# Patient Record
Sex: MALE | Race: BLACK OR AFRICAN AMERICAN | URBAN - METROPOLITAN AREA
[De-identification: names, ages, dates, MRNs, and addresses within clinical notes are randomized per-mention and may not be internally consistent; named-entity substitution may affect disease eponyms.]

---

## 2018-08-10 ENCOUNTER — INPATIENT (INPATIENT)
Facility: HOSPITAL | Age: 43
LOS: 1 days | Discharge: HOME | DRG: 603 | End: 2018-08-12
Attending: HOSPITALIST | Admitting: HOSPITALIST
Payer: COMMERCIAL

## 2018-08-10 VITALS
DIASTOLIC BLOOD PRESSURE: 76 MMHG | SYSTOLIC BLOOD PRESSURE: 153 MMHG | WEIGHT: 244.93 LBS | TEMPERATURE: 99 F | HEART RATE: 97 BPM | OXYGEN SATURATION: 93 % | RESPIRATION RATE: 16 BRPM | HEIGHT: 65 IN

## 2018-08-10 DIAGNOSIS — L03.115 CELLULITIS OF RIGHT LOWER LIMB: ICD-10-CM

## 2018-08-10 DIAGNOSIS — R55 SYNCOPE AND COLLAPSE: ICD-10-CM

## 2018-08-10 DIAGNOSIS — Z29.9 ENCOUNTER FOR PROPHYLACTIC MEASURES, UNSPECIFIED: ICD-10-CM

## 2018-08-10 DIAGNOSIS — I10 ESSENTIAL (PRIMARY) HYPERTENSION: ICD-10-CM

## 2018-08-10 LAB
ALBUMIN SERPL ELPH-MCNC: 4.2 G/DL — SIGNIFICANT CHANGE UP (ref 3.3–5)
ALP SERPL-CCNC: 70 U/L — SIGNIFICANT CHANGE UP (ref 40–120)
ALT FLD-CCNC: 73 U/L — SIGNIFICANT CHANGE UP (ref 12–78)
ANION GAP SERPL CALC-SCNC: 9 MMOL/L — SIGNIFICANT CHANGE UP (ref 5–17)
AST SERPL-CCNC: 32 U/L — SIGNIFICANT CHANGE UP (ref 15–37)
BASOPHILS # BLD AUTO: 0.06 K/UL — SIGNIFICANT CHANGE UP (ref 0–0.2)
BASOPHILS NFR BLD AUTO: 0.3 % — SIGNIFICANT CHANGE UP (ref 0–2)
BILIRUB SERPL-MCNC: 1 MG/DL — SIGNIFICANT CHANGE UP (ref 0.2–1.2)
BUN SERPL-MCNC: 22 MG/DL — SIGNIFICANT CHANGE UP (ref 7–23)
CALCIUM SERPL-MCNC: 8.8 MG/DL — SIGNIFICANT CHANGE UP (ref 8.5–10.1)
CHLORIDE SERPL-SCNC: 98 MMOL/L — SIGNIFICANT CHANGE UP (ref 96–108)
CK MB CFR SERPL CALC: 2 NG/ML — SIGNIFICANT CHANGE UP (ref 0–3.6)
CO2 SERPL-SCNC: 29 MMOL/L — SIGNIFICANT CHANGE UP (ref 22–31)
CREAT SERPL-MCNC: 1.2 MG/DL — SIGNIFICANT CHANGE UP (ref 0.5–1.3)
D DIMER BLD IA.RAPID-MCNC: <150 NG/ML DDU — SIGNIFICANT CHANGE UP
EOSINOPHIL # BLD AUTO: 0.07 K/UL — SIGNIFICANT CHANGE UP (ref 0–0.5)
EOSINOPHIL NFR BLD AUTO: 0.3 % — SIGNIFICANT CHANGE UP (ref 0–6)
GLUCOSE SERPL-MCNC: 100 MG/DL — HIGH (ref 70–99)
HCT VFR BLD CALC: 44.9 % — SIGNIFICANT CHANGE UP (ref 39–50)
HGB BLD-MCNC: 15.7 G/DL — SIGNIFICANT CHANGE UP (ref 13–17)
IMM GRANULOCYTES NFR BLD AUTO: 0.5 % — SIGNIFICANT CHANGE UP (ref 0–1.5)
LACTATE SERPL-SCNC: 0.9 MMOL/L — SIGNIFICANT CHANGE UP (ref 0.7–2)
LYMPHOCYTES # BLD AUTO: 0.98 K/UL — LOW (ref 1–3.3)
LYMPHOCYTES # BLD AUTO: 4.7 % — LOW (ref 13–44)
MCHC RBC-ENTMCNC: 29.2 PG — SIGNIFICANT CHANGE UP (ref 27–34)
MCHC RBC-ENTMCNC: 35 GM/DL — SIGNIFICANT CHANGE UP (ref 32–36)
MCV RBC AUTO: 83.6 FL — SIGNIFICANT CHANGE UP (ref 80–100)
MONOCYTES # BLD AUTO: 1.31 K/UL — HIGH (ref 0–0.9)
MONOCYTES NFR BLD AUTO: 6.2 % — SIGNIFICANT CHANGE UP (ref 2–14)
NEUTROPHILS # BLD AUTO: 18.46 K/UL — HIGH (ref 1.8–7.4)
NEUTROPHILS NFR BLD AUTO: 88 % — HIGH (ref 43–77)
PLATELET # BLD AUTO: 246 K/UL — SIGNIFICANT CHANGE UP (ref 150–400)
POTASSIUM SERPL-MCNC: 4 MMOL/L — SIGNIFICANT CHANGE UP (ref 3.5–5.3)
POTASSIUM SERPL-SCNC: 4 MMOL/L — SIGNIFICANT CHANGE UP (ref 3.5–5.3)
PROT SERPL-MCNC: 7.8 G/DL — SIGNIFICANT CHANGE UP (ref 6–8.3)
RBC # BLD: 5.37 M/UL — SIGNIFICANT CHANGE UP (ref 4.2–5.8)
RBC # FLD: 12.6 % — SIGNIFICANT CHANGE UP (ref 10.3–14.5)
SODIUM SERPL-SCNC: 136 MMOL/L — SIGNIFICANT CHANGE UP (ref 135–145)
TROPONIN I SERPL-MCNC: <.015 NG/ML — SIGNIFICANT CHANGE UP (ref 0.01–0.04)
WBC # BLD: 20.99 K/UL — HIGH (ref 3.8–10.5)
WBC # FLD AUTO: 20.99 K/UL — HIGH (ref 3.8–10.5)

## 2018-08-10 PROCEDURE — 70450 CT HEAD/BRAIN W/O DYE: CPT | Mod: 26

## 2018-08-10 PROCEDURE — 93971 EXTREMITY STUDY: CPT | Mod: 26,RT

## 2018-08-10 PROCEDURE — 93010 ELECTROCARDIOGRAM REPORT: CPT

## 2018-08-10 PROCEDURE — 99284 EMERGENCY DEPT VISIT MOD MDM: CPT

## 2018-08-10 PROCEDURE — 99223 1ST HOSP IP/OBS HIGH 75: CPT | Mod: GC,AI

## 2018-08-10 PROCEDURE — 71045 X-RAY EXAM CHEST 1 VIEW: CPT | Mod: 26

## 2018-08-10 RX ORDER — LACTOBACILLUS ACIDOPHILUS 100MM CELL
1 CAPSULE ORAL
Qty: 0 | Refills: 0 | Status: DISCONTINUED | OUTPATIENT
Start: 2018-08-10 | End: 2018-08-12

## 2018-08-10 RX ORDER — SODIUM CHLORIDE 9 MG/ML
1000 INJECTION INTRAMUSCULAR; INTRAVENOUS; SUBCUTANEOUS ONCE
Qty: 0 | Refills: 0 | Status: COMPLETED | OUTPATIENT
Start: 2018-08-10 | End: 2018-08-10

## 2018-08-10 RX ORDER — HYDROCHLOROTHIAZIDE 25 MG
12.5 TABLET ORAL DAILY
Qty: 0 | Refills: 0 | Status: DISCONTINUED | OUTPATIENT
Start: 2018-08-10 | End: 2018-08-10

## 2018-08-10 RX ORDER — TRAMADOL HYDROCHLORIDE 50 MG/1
25 TABLET ORAL EVERY 8 HOURS
Qty: 0 | Refills: 0 | Status: DISCONTINUED | OUTPATIENT
Start: 2018-08-10 | End: 2018-08-12

## 2018-08-10 RX ORDER — IBUPROFEN 200 MG
400 TABLET ORAL EVERY 6 HOURS
Qty: 0 | Refills: 0 | Status: DISCONTINUED | OUTPATIENT
Start: 2018-08-10 | End: 2018-08-12

## 2018-08-10 RX ORDER — ACETAMINOPHEN 500 MG
650 TABLET ORAL EVERY 6 HOURS
Qty: 0 | Refills: 0 | Status: DISCONTINUED | OUTPATIENT
Start: 2018-08-10 | End: 2018-08-12

## 2018-08-10 RX ORDER — LISINOPRIL 2.5 MG/1
20 TABLET ORAL DAILY
Qty: 0 | Refills: 0 | Status: DISCONTINUED | OUTPATIENT
Start: 2018-08-10 | End: 2018-08-12

## 2018-08-10 RX ADMIN — Medication 100 MILLIGRAM(S): at 17:56

## 2018-08-10 RX ADMIN — SODIUM CHLORIDE 1000 MILLILITER(S): 9 INJECTION INTRAMUSCULAR; INTRAVENOUS; SUBCUTANEOUS at 11:08

## 2018-08-10 RX ADMIN — SODIUM CHLORIDE 2000 MILLILITER(S): 9 INJECTION INTRAMUSCULAR; INTRAVENOUS; SUBCUTANEOUS at 11:08

## 2018-08-10 RX ADMIN — Medication 600 MILLIGRAM(S): at 11:09

## 2018-08-10 RX ADMIN — Medication 100 MILLIGRAM(S): at 11:09

## 2018-08-10 RX ADMIN — Medication 1 TABLET(S): at 18:00

## 2018-08-10 NOTE — H&P ADULT - NSHPSOCIALHISTORY_GEN_ALL_CORE
Marital Status: Single  Occupation: Computer/desk job  Lives with: mom    Performs ADLs independently, ambulates without assistance, uses stairs     Substance Use (street drugs): denies  Tobacco Usage: denies  Alcohol Usage: denies

## 2018-08-10 NOTE — ED PROVIDER NOTE - MEDICAL DECISION MAKING DETAILS
pt with dizziness, syncope and right calf pain and swelling.  labs, US, dimer r/o DVT, PE, cellulitis

## 2018-08-10 NOTE — ED ADULT NURSE NOTE - CHPI ED NUR SYMPTOMS NEG
no congestion/no dizziness/no fever/no chills/no diaphoresis/no back pain/no vomiting/no chest pain/no shortness of breath/no nausea

## 2018-08-10 NOTE — ED PROVIDER NOTE - OBJECTIVE STATEMENT
44 y/o M with c/o dizziness and syncope today while at work.  Pt states he was working and felt dizzy and fell to the ground.  Pt does not believe he lost consciousness.  Pt denies chest pain, SOB, fevers.  Pt is also c/o right calf swelling, pain and redness.  Pt states he completed a course of oral antibiotics 1 week ago for right LE cellulitis which resolved.

## 2018-08-10 NOTE — ED ADULT NURSE NOTE - ED STAT RN HANDOFF DETAILS
Spoke to Dr. Reyes patient made remote tell and diet ordered. MD also made aware of low grade temp for tylrnol order. Patient to go to floor.

## 2018-08-10 NOTE — H&P ADULT - ASSESSMENT
43M with PMH HTN presents with syncope at work, admitted for syncope and R lower extremity cellulitis.

## 2018-08-10 NOTE — H&P ADULT - NSHPREVIEWOFSYSTEMS_GEN_ALL_CORE
CONSTITUTIONAL: No fever, No fatigue  EYES: No eye pain, visual disturbances, or discharge  ENMT:  No ear pain; No sinus or throat pain  NECK: No pain, No stiffness  RESPIRATORY: No cough, wheezing, No hemoptysis; No shortness of breath  CARDIOVASCULAR: No chest pain, palpitations, leg swelling  GASTROINTESTINAL: No abdominal or epigastric pain. No nausea, No vomiting; No diarrhea or constipation  GENITOURINARY: No dysuria, No frequency, No urgency, No hematuria, or incontinence  NEUROLOGICAL: alert and oriented x 3,  No headaches, No dizziness, No numbness,  SKIN:   No itching, burning, rashes, or lesions   MUSCULOSKELETAL: No joint pain or swelling; No back pain  PSYCHIATRIC: No depression, anxiety, mood swings, or difficulty sleeping

## 2018-08-10 NOTE — ED PROVIDER NOTE - CARE PLAN
Principal Discharge DX:	Cellulitis of right lower extremity  Secondary Diagnosis:	Syncope, unspecified syncope type

## 2018-08-10 NOTE — ED ADULT NURSE NOTE - ED STAT RN HANDOFF DETAILS 2
Patient admitted to 3 W report given to RN Lani in no acute distress. To be transported with cardiac monitor and RN and transporter stable in no acute distress , safety maintained.

## 2018-08-10 NOTE — H&P ADULT - PROBLEM SELECTOR PLAN 1
Likely vasovagal given prodromal symptoms. CT head negative.   -Admit to medicine  -Remote telemetry monitor ordered to rule out underlying arrythmia. EKG: NSR and first set of cardiac enzymes negative, less likely cardiac related.  -Follow up orthostatics  -Encourage oral hydration, if orthostatics are positive will order fluids. Will hold HCTZ diuretic until orthostatic results. Likely vasovagal given prodromal symptoms. CT head negative. Doppler neg.   -Admit to medicine  -Remote telemetry monitor ordered to rule out underlying arrythmia. EKG: NSR and first set of cardiac enzymes negative, less likely cardiac related.  -Follow up orthostatics  -Encourage oral hydration, if orthostatics are positive will order fluids. Will hold HCTZ diuretic until orthostatic results.

## 2018-08-10 NOTE — H&P ADULT - PROBLEM SELECTOR PLAN 2
Symptoms resolving, mild erythema with no swelling. Will hold off continued medication treatment. Received 1 dose of IV Clindamycin in the ED.  -Trend temperature curve  -Trend WBC, follow up AM labs  -Pain control with Tylenol PRN for mild pain, Motrin PRN for moderate pain, Ultram PRN for severe pain Symptoms resolving, mild erythema with no swelling. Patient doesn't warrant abx but requesting IV abx, discussed potential adverse effects of c. diff diarrhea.   -Received 1 dose of IV Clindamycin in the ED. Continue IV Clindamycin  -Probiotic with meals  -Trend temperature curve  -Trend WBC, follow up AM labs  -Pain control with Tylenol PRN for mild pain, Motrin PRN for moderate pain, Ultram PRN for severe pain Symptoms resolving, mild erythema with no swelling. Patient doesn't warrant abx but requesting IV abx, discussed potential adverse effects including c. diff diarrhea. Still, patient requests antibiotics   -Received 1 dose of IV Clindamycin in the ED. Continue IV Clindamycin with goal of taper and DC home on PO  -Probiotic with meals  -Trend temperature curve  -Trend WBC, follow up AM labs  -Pain control with Tylenol PRN for mild pain, Motrin PRN for moderate pain, Ultram PRN for severe pain

## 2018-08-10 NOTE — ED ADULT NURSE NOTE - OBJECTIVE STATEMENT
Patient presents to ED with syncopal episode. Patient states he was with a client and he passed on and someone woke him up and EMS was called. Patient also noted to have right calf cellulitis with redness and pain scale 4/10.

## 2018-08-10 NOTE — H&P ADULT - PROBLEM SELECTOR PLAN 3
chronic, stable  -Continue Lisinopril (therapeutic interchange of Ramipril)  -Will hold off HCTZ diuretic until results of orthostatics

## 2018-08-10 NOTE — H&P ADULT - HISTORY OF PRESENT ILLNESS
43M with PMH HTN presents with syncope. Patient reports sitting at his desk at work at 8:30am this morning and started feeling nauseous dizzy, diaphoretic and fainted. Admits to LOC, unsure of duration. Denies head trauma. Denies loss of danika/bladder function. Reports several months ago had a syncopal episode after aggressively coughing, was seen at [facility], and discharged from the ED. Denies change in appetite or fluid intake. Reports 1.5 months ago wad diagnosed with cellulitis of his calf, prescribed Clindamycin 300mg by his PCP, symptoms resolved. Reports cellulitis of the same calf re-emerged 2 weeks ago, prescribed Doxycycline for 10 days (last dose Sunday 8/5), reports erythema and swelling has greatly improved. States this morning he woke up with R calf pain, non-radiating, rated 3-4/10. Denies trauma to area. Denies difficulty ambulating.  Also admits to diaphoresis the past two nights, improved with air conditioning.     In the ED: temp 99.2, HR 93, /73, RR 18, SpO2 95% RA. WBC 20.99. CT head: No acute intracranial hemorrhage, mass effect, or shift of   the midline structures.. U/S venous left lower extremity: negative for DVT. Chest Xray: clear lungs. Received IV Clindamycin, 1L NS bolus in the ED.

## 2018-08-10 NOTE — ED ADULT NURSE NOTE - NSIMPLEMENTINTERV_GEN_ALL_ED
Implemented All Universal Safety Interventions:  [place] to call system. Call bell, personal items and telephone within reach. Instruct patient to call for assistance. Room bathroom lighting operational. Non-slip footwear when patient is off stretcher. Physically safe environment: no spills, clutter or unnecessary equipment. Stretcher in lowest position, wheels locked, appropriate side rails in place.

## 2018-08-10 NOTE — H&P ADULT - PROBLEM SELECTOR PLAN 4
IMPROVE VTE Individual Risk Assessment          RISK                                                          Points  [  ] Previous VTE                                                3  [  ] Thrombophilia                                             2  [  ] Lower limb paralysis                                   2        (unable to hold up >15 seconds)    [  ] Current Cancer                                             2         (within 6 months)  [  ] Immobilization > 24 hrs                              1  [  ] ICU/CCU stay > 24 hours                             1  [  ] Age > 60                                                         1    IMPROVE VTE Score: 0  DVT prophylaxis: SCDs

## 2018-08-10 NOTE — H&P ADULT - NSHPPHYSICALEXAM_GEN_ALL_CORE
Height (cm): 165.1 (08-10 @ 08:57)  Weight (kg): 111.1 (08-10 @ 08:57)  BMI (kg/m2): 40.8 (08-10 @ 08:57)  BSA (m2): 2.16 (08-10 @ 08:57)  Vital Signs Last 24 Hrs  T(C): 37.3 (10 Aug 2018 12:59), Max: 37.3 (10 Aug 2018 12:59)  T(F): 99.2 (10 Aug 2018 12:59), Max: 99.2 (10 Aug 2018 12:59)  HR: 93 (10 Aug 2018 12:59) (93 - 97)  BP: 118/75 (10 Aug 2018 12:59) (118/75 - 153/76)  BP(mean): --  RR: 18 (10 Aug 2018 12:59) (16 - 18)  SpO2: 95% (10 Aug 2018 12:59) (93% - 95%)  [ X] room air   [ ] 02    PHYSICAL EXAM:  GENERAL:  No acute distress, well appearing  HEAD:  AT/NC  ENMT: EOMI, PERRL, moist mucus membranes  NECK:  supple  NERVOUS SYSTEM:  Alert & Oriented X3, no focal deficits  CHEST/LUNG: Clear to auscultation bilaterally, no wheezing, rhonchi, crackles   HEART:  Regular rate and rhythm, No murmurs, rubs, or gallops   ABDOMEN:  soft, nontender, nondistended, positive bowel sounds   EXTREMITIES: No clubbing, cyanosis or edema  SKIN: mild/faint erythema around right lower extremity, no calf tenderness, calf muscles bilaterally symmetrical

## 2018-08-11 PROBLEM — Z00.00 ENCOUNTER FOR PREVENTIVE HEALTH EXAMINATION: Status: ACTIVE | Noted: 2018-08-11

## 2018-08-11 LAB
ANION GAP SERPL CALC-SCNC: 9 MMOL/L — SIGNIFICANT CHANGE UP (ref 5–17)
BASOPHILS # BLD AUTO: 0.03 K/UL — SIGNIFICANT CHANGE UP (ref 0–0.2)
BASOPHILS NFR BLD AUTO: 0.3 % — SIGNIFICANT CHANGE UP (ref 0–2)
BUN SERPL-MCNC: 16 MG/DL — SIGNIFICANT CHANGE UP (ref 7–23)
CALCIUM SERPL-MCNC: 8.6 MG/DL — SIGNIFICANT CHANGE UP (ref 8.5–10.1)
CHLORIDE SERPL-SCNC: 102 MMOL/L — SIGNIFICANT CHANGE UP (ref 96–108)
CO2 SERPL-SCNC: 29 MMOL/L — SIGNIFICANT CHANGE UP (ref 22–31)
CREAT SERPL-MCNC: 0.89 MG/DL — SIGNIFICANT CHANGE UP (ref 0.5–1.3)
EOSINOPHIL # BLD AUTO: 0.12 K/UL — SIGNIFICANT CHANGE UP (ref 0–0.5)
EOSINOPHIL NFR BLD AUTO: 1.2 % — SIGNIFICANT CHANGE UP (ref 0–6)
GLUCOSE SERPL-MCNC: 117 MG/DL — HIGH (ref 70–99)
HCT VFR BLD CALC: 40.4 % — SIGNIFICANT CHANGE UP (ref 39–50)
HGB BLD-MCNC: 14.1 G/DL — SIGNIFICANT CHANGE UP (ref 13–17)
IMM GRANULOCYTES NFR BLD AUTO: 0.3 % — SIGNIFICANT CHANGE UP (ref 0–1.5)
LYMPHOCYTES # BLD AUTO: 1.98 K/UL — SIGNIFICANT CHANGE UP (ref 1–3.3)
LYMPHOCYTES # BLD AUTO: 20.3 % — SIGNIFICANT CHANGE UP (ref 13–44)
MCHC RBC-ENTMCNC: 29.1 PG — SIGNIFICANT CHANGE UP (ref 27–34)
MCHC RBC-ENTMCNC: 34.9 GM/DL — SIGNIFICANT CHANGE UP (ref 32–36)
MCV RBC AUTO: 83.3 FL — SIGNIFICANT CHANGE UP (ref 80–100)
MONOCYTES # BLD AUTO: 1.29 K/UL — HIGH (ref 0–0.9)
MONOCYTES NFR BLD AUTO: 13.2 % — SIGNIFICANT CHANGE UP (ref 2–14)
NEUTROPHILS # BLD AUTO: 6.29 K/UL — SIGNIFICANT CHANGE UP (ref 1.8–7.4)
NEUTROPHILS NFR BLD AUTO: 64.7 % — SIGNIFICANT CHANGE UP (ref 43–77)
PLATELET # BLD AUTO: 212 K/UL — SIGNIFICANT CHANGE UP (ref 150–400)
POTASSIUM SERPL-MCNC: 3.7 MMOL/L — SIGNIFICANT CHANGE UP (ref 3.5–5.3)
POTASSIUM SERPL-SCNC: 3.7 MMOL/L — SIGNIFICANT CHANGE UP (ref 3.5–5.3)
RBC # BLD: 4.85 M/UL — SIGNIFICANT CHANGE UP (ref 4.2–5.8)
RBC # FLD: 12.9 % — SIGNIFICANT CHANGE UP (ref 10.3–14.5)
SODIUM SERPL-SCNC: 140 MMOL/L — SIGNIFICANT CHANGE UP (ref 135–145)
WBC # BLD: 9.74 K/UL — SIGNIFICANT CHANGE UP (ref 3.8–10.5)
WBC # FLD AUTO: 9.74 K/UL — SIGNIFICANT CHANGE UP (ref 3.8–10.5)

## 2018-08-11 PROCEDURE — 99233 SBSQ HOSP IP/OBS HIGH 50: CPT

## 2018-08-11 RX ORDER — RAMIPRIL 5 MG
0 CAPSULE ORAL
Qty: 0 | Refills: 0 | COMMUNITY

## 2018-08-11 RX ORDER — HYDROCHLOROTHIAZIDE 25 MG
0 TABLET ORAL
Qty: 0 | Refills: 0 | COMMUNITY

## 2018-08-11 RX ORDER — RAMIPRIL 5 MG
1 CAPSULE ORAL
Qty: 0 | Refills: 0 | COMMUNITY

## 2018-08-11 RX ADMIN — Medication 1 TABLET(S): at 18:00

## 2018-08-11 RX ADMIN — LISINOPRIL 20 MILLIGRAM(S): 2.5 TABLET ORAL at 05:24

## 2018-08-11 RX ADMIN — Medication 100 MILLIGRAM(S): at 00:03

## 2018-08-11 RX ADMIN — Medication 100 MILLIGRAM(S): at 12:11

## 2018-08-11 RX ADMIN — Medication 100 MILLIGRAM(S): at 05:24

## 2018-08-11 RX ADMIN — Medication 1 TABLET(S): at 12:11

## 2018-08-11 RX ADMIN — Medication 100 MILLIGRAM(S): at 18:00

## 2018-08-11 RX ADMIN — Medication 1 TABLET(S): at 08:31

## 2018-08-11 NOTE — PROGRESS NOTE ADULT - PROBLEM SELECTOR PLAN 1
Likely vasovagal given prodromal symptoms. CT head negative. Doppler neg.   -Remote telemetry monitor ordered to rule out underlying arrythmia.   EKG: NSR and first set of cardiac enzymes negative, less likely cardiac related.  -No orthostatics, will hold diuretic

## 2018-08-11 NOTE — PROGRESS NOTE ADULT - PROBLEM SELECTOR PLAN 2
Afebrile, no leucocytosis,  mild erythema with no swelling.   Patient requesting IV abx, discussed potential adverse effects including c. diff diarrhea. Still, patient requests antibiotics   Continue IV Clindamycin with goal of taper and DC home on PO  -Probiotic with meals  -Trend WBC, follow up AM labs  -Pain control

## 2018-08-11 NOTE — PROGRESS NOTE ADULT - SUBJECTIVE AND OBJECTIVE BOX
Patient is a 43y old  Male who presents with a chief complaint of cellulitis, syncope (10 Aug 2018 13:07)      INTERVAL HPI: Pt seen and examined bedside, has no complaints. except itching of RLL, no cp, SOB  OVERNIGHT EVENTS:  T(F): 98.4 (08-11-18 @ 05:14), Max: 98.4 (08-10-18 @ 20:22)  HR: 75 (08-11-18 @ 05:14) (75 - 97)  BP: 114/76 (08-11-18 @ 05:14) (114/76 - 123/78)  RR: 17 (08-11-18 @ 05:14) (16 - 17)  SpO2: 96% (08-11-18 @ 05:14) (93% - 96%)  Wt(kg): --  I&O's Summary    10 Aug 2018 07:01  -  11 Aug 2018 07:00  --------------------------------------------------------  IN: 760 mL / OUT: 0 mL / NET: 760 mL        REVIEW OF SYSTEM:    Constitutional: No fever, chills, fatigue  Neuro: No headache, numbness, weakness  Resp: No cough, wheezing, shortness of breath  CVS: No chest pain, palpitations, leg swelling  GI: No abdominal pain, nausea, vomiting, diarrhea   : No dysuria, frequency, incontinence  Skin: No itching, burning, rashes, or lesions   Msk: No joint pain or swelling, itching of RLL,  Psych: No depression, anxiety, mood swings          PHYSICAL EXAM:  GENERAL: NAD, well-developed  HEAD:  Atraumatic, Normocephalic  EYES: EOMI, PERRLA, conjunctiva and sclera clear  ENMT: No tonsillar erythema, exudates, or enlargement; Moist mucous membranes,   NECK: Supple, No JVD, Normal thyroid  HEART: Regular rate and rhythm; No murmurs, rubs, or gallops  RESPIRATORY: CTA B/L, No wheezing / rhonchi  ABDOMEN: Soft, Nontender, Nondistended; Bowel sounds present  NEUROLOGY: A&Ox3, nonfocal, moving all extremities.  EXTREMITIES:  2+ Peripheral Pulses, erythema of Rt leg no tenderness  SKIN: warm, dry, normal color, no rash or abnormal lesions        LABS:                        14.1   9.74  )-----------( 212      ( 11 Aug 2018 07:06 )             40.4     08-11    140  |  102  |  16  ----------------------------<  117<H>  3.7   |  29  |  0.89    Ca    8.6      11 Aug 2018 07:06    TPro  7.8  /  Alb  4.2  /  TBili  1.0  /  DBili  x   /  AST  32  /  ALT  73  /  AlkPhos  70  08-10        CAPILLARY BLOOD GLUCOSE                  MEDICATIONS  (STANDING):  clindamycin IVPB 300 milliGRAM(s) IV Intermittent every 6 hours  lactobacillus acidophilus 1 Tablet(s) Oral three times a day with meals  lisinopril 20 milliGRAM(s) Oral daily    MEDICATIONS  (PRN):  acetaminophen   Tablet. 650 milliGRAM(s) Oral every 6 hours PRN Mild Pain (1 - 3)  ibuprofen  Tablet 400 milliGRAM(s) Oral every 6 hours PRN moderate pain (4-7)  traMADol 25 milliGRAM(s) Oral every 8 hours PRN Severe Pain (7 - 10)

## 2018-08-12 ENCOUNTER — TRANSCRIPTION ENCOUNTER (OUTPATIENT)
Age: 43
End: 2018-08-12

## 2018-08-12 VITALS
SYSTOLIC BLOOD PRESSURE: 112 MMHG | HEART RATE: 66 BPM | TEMPERATURE: 98 F | RESPIRATION RATE: 16 BRPM | OXYGEN SATURATION: 98 % | DIASTOLIC BLOOD PRESSURE: 71 MMHG

## 2018-08-12 PROCEDURE — 70450 CT HEAD/BRAIN W/O DYE: CPT

## 2018-08-12 PROCEDURE — 80048 BASIC METABOLIC PNL TOTAL CA: CPT

## 2018-08-12 PROCEDURE — 84484 ASSAY OF TROPONIN QUANT: CPT

## 2018-08-12 PROCEDURE — 93971 EXTREMITY STUDY: CPT

## 2018-08-12 PROCEDURE — 99285 EMERGENCY DEPT VISIT HI MDM: CPT | Mod: 25

## 2018-08-12 PROCEDURE — 93005 ELECTROCARDIOGRAM TRACING: CPT

## 2018-08-12 PROCEDURE — 80053 COMPREHEN METABOLIC PANEL: CPT

## 2018-08-12 PROCEDURE — 83605 ASSAY OF LACTIC ACID: CPT

## 2018-08-12 PROCEDURE — 85379 FIBRIN DEGRADATION QUANT: CPT

## 2018-08-12 PROCEDURE — 87040 BLOOD CULTURE FOR BACTERIA: CPT

## 2018-08-12 PROCEDURE — 82962 GLUCOSE BLOOD TEST: CPT

## 2018-08-12 PROCEDURE — 82553 CREATINE MB FRACTION: CPT

## 2018-08-12 PROCEDURE — 85027 COMPLETE CBC AUTOMATED: CPT

## 2018-08-12 PROCEDURE — 71045 X-RAY EXAM CHEST 1 VIEW: CPT

## 2018-08-12 PROCEDURE — 99239 HOSP IP/OBS DSCHRG MGMT >30: CPT

## 2018-08-12 RX ORDER — LACTOBACILLUS ACIDOPHILUS 100MM CELL
1 CAPSULE ORAL
Qty: 21 | Refills: 0 | OUTPATIENT
Start: 2018-08-12 | End: 2018-08-18

## 2018-08-12 RX ORDER — ACETAMINOPHEN 500 MG
2 TABLET ORAL
Qty: 0 | Refills: 0 | COMMUNITY
Start: 2018-08-12

## 2018-08-12 RX ADMIN — Medication 100 MILLIGRAM(S): at 00:25

## 2018-08-12 RX ADMIN — LISINOPRIL 20 MILLIGRAM(S): 2.5 TABLET ORAL at 05:25

## 2018-08-12 RX ADMIN — Medication 100 MILLIGRAM(S): at 05:25

## 2018-08-12 RX ADMIN — Medication 1 TABLET(S): at 08:41

## 2018-08-12 NOTE — DISCHARGE NOTE ADULT - PLAN OF CARE
Recovered. You had an episode of syncope 2/2 vasovegal episode.  Follow with Cardio Recurrent Rt leg cellulitis treated with IV clindamycin. responded well  Pt was made aware of side effects of antibx including C-diff colitis.   C/W clindamycin 300 mg po QID c/w home meds.  follow with cardio

## 2018-08-12 NOTE — DISCHARGE NOTE ADULT - PATIENT PORTAL LINK FT
You can access the [facility] Patient Portal, offered by [facility], by registering with the following website: [email or web address]

## 2018-08-12 NOTE — DISCHARGE NOTE ADULT - CARE PROVIDER_API CALL
Cabrera Givens), Cardiovascular Disease; Internal Medicine  [address] [place] [address]  Phone: [phone]  Fax: [phone]

## 2018-08-12 NOTE — DISCHARGE NOTE ADULT - MEDICATION SUMMARY - MEDICATIONS TO TAKE
I will START or STAY ON the medications listed below when I get home from the hospital:    acetaminophen 325 mg oral tablet  -- 2 tab(s) by mouth every 6 hours, As needed, Mild Pain (1 - 3)  -- Indication: For Pain    ramipril 5 mg oral capsule  -- 1 cap(s) by mouth once a day  -- Indication: For Hypertension    hydroCHLOROthiazide 12.5 mg oral capsule  -- 1 cap(s) by mouth once a day    med rec verified by [facility]  -- Indication: For Hypertension    clindamycin 300 mg oral capsule  -- 1 cap(s) by mouth every 6 hours   -- Indication: For Cellulitis of right lower extremity    lactobacillus acidophilus oral capsule  -- 1 cap(s) by mouth 3 times a day  -- Indication: For Prophylactic measure

## 2018-08-12 NOTE — DISCHARGE NOTE ADULT - HOSPITAL COURSE
43M with PMH HTN presents with syncope. Patient reports sitting at his desk at work at 8:30am this morning and started feeling nauseous dizzy, diaphoretic and fainted. Admits to LOC, unsure of duration. Denies head trauma. Denies loss of danika/bladder function. Reports several months ago had a syncopal episode after aggressively coughing, was seen at [facility], and discharged from the ED. Denies change in appetite or fluid intake. Reports 1.5 months ago wad diagnosed with cellulitis of his calf, prescribed Clindamycin 300mg by his PCP, symptoms resolved. Reports cellulitis of the same calf re-emerged 2 weeks ago, prescribed Doxycycline for 10 days (last dose Sunday 8/5), reports erythema and swelling has greatly improved. States this morning he woke up with R calf pain, non-radiating, rated 3-4/10. Denies trauma to area. Denies difficulty ambulating.  Also admits to diaphoresis the past two nights, improved with air conditioning.     In the ED: temp 99.2, HR 93, /73, RR 18, SpO2 95% RA. WBC 20.99. CT head: No acute intracranial hemorrhage, mass effect, or shift of   the midline structures.. U/S venous left lower extremity: negative for DVT. Chest Xray: clear lungs. Received IV Clindamycin, 1L NS bolus in the ED.  Admitted for Syncope, r/o arrythmia, TIA. on tele. Pt had alise CT head and trops neg, no arrythmia on tele. Pt has had cardiac w/u with cardiologist 2-3 months ago and was given a clean chit.  pt remained asymptomatic and cellulitis improved on IV clindamycin. switched to po clindamycin, Pt was made aware of side effects of antibx including C-diff colitis.   C/W clindamycin 300 mg po QIDx 5 days  Vital Signs Last 24 Hrs  T(C): 36.6 (12 Aug 2018 05:15), Max: 36.9 (11 Aug 2018 21:05)  T(F): 97.9 (12 Aug 2018 05:15), Max: 98.4 (11 Aug 2018 21:05)  HR: 66 (12 Aug 2018 05:15) (66 - 85)  BP: 112/71 (12 Aug 2018 05:15) (112/71 - 116/76)  BP(mean): --  RR: 16 (12 Aug 2018 05:15) (16 - 17)  SpO2: 98% (12 Aug 2018 05:15) (94% - 98%)    PHYSICAL EXAM:  	GENERAL:  No acute distress, well appearing  	HEAD:  AT/NC  	ENMT: EOMI, PERRL, moist mucus membranes  	NECK:  supple  	NERVOUS SYSTEM:  Alert & Oriented X3, no focal deficits  	CHEST/LUNG: Clear to auscultation bilaterally, no wheezing, rhonchi, crackles   	HEART:  Regular rate and rhythm, No murmurs, rubs, or gallops   	ABDOMEN:  soft, nontender, nondistended, positive bowel sounds   	EXTREMITIES: No clubbing, cyanosis or edema  SKIN: mild/faint erythema around right lower extremity, no calf tenderness, calf muscles bilaterally symmetrical    Pt has no PMD and will follow with cardio.  adv to make appointment with PMD

## 2018-08-12 NOTE — DISCHARGE NOTE ADULT - CARE PLAN
Principal Discharge DX:	Syncope, unspecified syncope type  Goal:	Recovered.  Assessment and plan of treatment:	You had an episode of syncope 2/2 vasovegal episode.  Follow with Cardio  Secondary Diagnosis:	Cellulitis of right lower extremity  Assessment and plan of treatment:	Recurrent Rt leg cellulitis treated with IV clindamycin. responded well  Pt was made aware of side effects of antibx including C-diff colitis.   C/W clindamycin 300 mg po QID  Secondary Diagnosis:	Hypertension  Assessment and plan of treatment:	c/w home meds.  follow with cardio

## 2018-08-15 LAB
CULTURE RESULTS: SIGNIFICANT CHANGE UP
CULTURE RESULTS: SIGNIFICANT CHANGE UP
SPECIMEN SOURCE: SIGNIFICANT CHANGE UP
SPECIMEN SOURCE: SIGNIFICANT CHANGE UP

## 2021-07-07 ENCOUNTER — APPOINTMENT (RX ONLY)
Dept: URBAN - NONMETROPOLITAN AREA CLINIC 22 | Facility: CLINIC | Age: 46
Setting detail: DERMATOLOGY
End: 2021-07-07

## 2021-07-07 DIAGNOSIS — Z00.8 ENCOUNTER FOR OTHER GENERAL EXAMINATION: ICD-10-CM

## 2021-07-07 DIAGNOSIS — L80 VITILIGO: ICD-10-CM

## 2021-07-07 DIAGNOSIS — L28.0 LICHEN SIMPLEX CHRONICUS: ICD-10-CM

## 2021-07-07 PROBLEM — D23.72 OTHER BENIGN NEOPLASM OF SKIN OF LEFT LOWER LIMB, INCLUDING HIP: Status: ACTIVE | Noted: 2021-07-07

## 2021-07-07 PROBLEM — L30.9 DERMATITIS, UNSPECIFIED: Status: ACTIVE | Noted: 2021-07-07

## 2021-07-07 PROCEDURE — ? DEFER

## 2021-07-07 PROCEDURE — 99204 OFFICE O/P NEW MOD 45 MIN: CPT

## 2021-07-07 PROCEDURE — ? PHE RELATED SUPPLIES PROVIDED/DISPENSED

## 2021-07-07 PROCEDURE — ? TREATMENT REGIMEN

## 2021-07-07 PROCEDURE — ? PRESCRIPTION

## 2021-07-07 PROCEDURE — 99072 ADDL SUPL MATRL&STAF TM PHE: CPT

## 2021-07-07 PROCEDURE — ? COUNSELING

## 2021-07-07 RX ORDER — TRIAMCINOLONE ACETONIDE 1 MG/G
CREAM TOPICAL BID
Qty: 1 | Refills: 3 | Status: ERX | COMMUNITY
Start: 2021-07-07

## 2021-07-07 RX ORDER — UREA 40 %
CREAM (GRAM) TOPICAL
Qty: 1 | Refills: 2 | Status: ERX | COMMUNITY
Start: 2021-07-07

## 2021-07-07 RX ADMIN — Medication: at 00:00

## 2021-07-07 RX ADMIN — TRIAMCINOLONE ACETONIDE: 1 CREAM TOPICAL at 00:00

## 2021-07-07 ASSESSMENT — LOCATION DETAILED DESCRIPTION DERM
LOCATION DETAILED: RIGHT PROXIMAL ULNAR DORSAL FOREARM
LOCATION DETAILED: LEFT RADIAL DORSAL HAND
LOCATION DETAILED: COLUMELLA
LOCATION DETAILED: RIGHT RADIAL DORSAL HAND

## 2021-07-07 ASSESSMENT — LOCATION ZONE DERM
LOCATION ZONE: HAND
LOCATION ZONE: ARM
LOCATION ZONE: NOSE

## 2021-07-07 ASSESSMENT — LOCATION SIMPLE DESCRIPTION DERM
LOCATION SIMPLE: RIGHT HAND
LOCATION SIMPLE: NOSE
LOCATION SIMPLE: RIGHT FOREARM
LOCATION SIMPLE: LEFT HAND

## 2021-07-07 NOTE — PROCEDURE: TREATMENT REGIMEN
Initiate Treatment: triamcinolone acetonide 0.1 % topical cream BID\\nDays Supply: 30\\nSig: Apply twice daily to rash on hands and arms up to a month.\\n\\nurea 40 % topical cream \\nDays Supply: 30\\nSig: Apply BID to hands and arms for one month
Detail Level: Zone
Plan: Advised to use TAC first, then UREA 40, then Vaseline/aquaphor on top BID with a glove or sock at overnight.
Plan: Advised to see his PCP to check his thyroid labs\\nAdvised to wear sunscreen

## 2022-09-07 NOTE — ED ADULT NURSE NOTE - CHPI ED NUR QUALITY2
Office Visit and Follow-up (Psoriasis)    Valerio Bergman is a 49 year old male presenting with follow up of psoriasis treatment. Patient states that he has new spots on both hands and eyelids. Patient also reported that the groin area has not improved much since last visit on 06/24/2022.  Patient is currently using hydrocortisone ointment and pimecrolimus cream which he feels like does help but not enough.   Medication verified and med list updated  Denies known Latex allergy or symptoms of Latex sensitivity     ALLERGIES:  No Known Allergies  Current Outpatient Medications   Medication Sig Dispense Refill   • pimecrolimus (Elidel) 1 % cream Indications: Psoriasis Apply to affected areas on groin once a day. 30 g 3   • triamcinolone (ARISTOCORT) 0.1 % ointment Apply topically two times daily as needed. 80 g 2   • hydroCORTisone valerate (WEST-NGUYỄN) 0.2 % ointment Apply topically 2 times daily. 45 g 1     No current facility-administered medications for this visit.     Social History     Tobacco Use   Smoking Status Never Smoker   Smokeless Tobacco Never Used             cramping

## 2024-06-12 NOTE — H&P ADULT - NSHPSOURCEINFORD_GEN_ALL_CORE
Patient
Apixaban/Eliquis - Compliance/Apixaban/Eliquis - Dietary Advice/Apixaban/Eliquis - Follow up monitoring/Apixaban/Eliquis - Potential for adverse drug reactions and interactions